# Patient Record
Sex: MALE | Race: WHITE | NOT HISPANIC OR LATINO | Employment: UNEMPLOYED | URBAN - METROPOLITAN AREA
[De-identification: names, ages, dates, MRNs, and addresses within clinical notes are randomized per-mention and may not be internally consistent; named-entity substitution may affect disease eponyms.]

---

## 2019-03-12 ENCOUNTER — OFFICE VISIT (OUTPATIENT)
Dept: FAMILY MEDICINE CLINIC | Facility: CLINIC | Age: 24
End: 2019-03-12

## 2019-03-12 VITALS
HEIGHT: 74 IN | WEIGHT: 238 LBS | BODY MASS INDEX: 30.54 KG/M2 | DIASTOLIC BLOOD PRESSURE: 86 MMHG | TEMPERATURE: 98 F | HEART RATE: 80 BPM | SYSTOLIC BLOOD PRESSURE: 112 MMHG

## 2019-03-12 DIAGNOSIS — S61.315D LACERATION OF LEFT RING FINGER WITHOUT FOREIGN BODY WITH DAMAGE TO NAIL, SUBSEQUENT ENCOUNTER: Primary | ICD-10-CM

## 2019-03-12 DIAGNOSIS — S62.639D OPEN AVULSION FRACTURE OF DISTAL PHALANX OF FINGER WITH ROUTINE HEALING, SUBSEQUENT ENCOUNTER: ICD-10-CM

## 2019-03-12 PROCEDURE — 99203 OFFICE O/P NEW LOW 30 MIN: CPT | Performed by: FAMILY MEDICINE

## 2019-03-12 RX ORDER — HYDROCODONE BITARTRATE AND ACETAMINOPHEN 5; 325 MG/1; MG/1
1 TABLET ORAL EVERY 6 HOURS PRN
COMMUNITY
End: 2019-03-15

## 2019-03-12 NOTE — PROGRESS NOTES
"Subjective   Anand Chahal is a 23 y.o. male seen today for Laceration (finger on left hand x 6 days ago).     Laceration    The incident occurred 5 to 7 days ago. The laceration is located on the left hand (left first finger.). The laceration mechanism was a blunt object (flap laceration of the left first finger). The pain is at a severity of 4/10. The pain has been fluctuating since onset. His tetanus status is UTD.      The injury occurred on March 6 while the patient was preparing to return from [place].  The trailer hitch on his vehicle disengaged and caught the tip of his left index finger.  He sustained a nearly circular laceration to the tip of his left index finger that included the distal portion of the fingernail.  There also was a guillotine fracture and loss of the distal aspect of the distal phalanx of the left second finger.  X-rays in the emergency department that he went to in [place] revealed no other fracture.  After thorough irrigation the soft tissue at the tip of the finger was approximated and sutured to the finger.  A total of 5 sutures were utilized including 1 absorbable Dexon applied through the fingernail in order to approximate the nail to the finger.    The following portions of the patient's history were reviewed and updated as appropriate: allergies, current medications, past social history and problem list.    Review of Systems   Constitutional: Negative for chills and fever.   Hematological: Does not bruise/bleed easily.       Objective   /86   Pulse 80   Temp 98 °F (36.7 °C)   Ht 188 cm (74\")   Wt 108 kg (238 lb)   BMI 30.56 kg/m²   Physical Exam   Skin:   Physical examination of the left ring finger reveals pink coloration of the skin at the distal fingertip.  There are a total of 5 sutures.  For these are Vicryl used to approximate the laceration of the skin.  There is 1 Dexon suture that is used to approximate the fingernail to the distal finger.  There is " some bruising at the pad of the finger but I think there is still viable tissue there.  There is no black skin.  There is no evidence of any erythema or swelling or pus.  1 of the Vicryl sutures is loose and was removed.  In addition the Dexon suture approximating the nail was removed as well.       Assessment/Plan   Problem List Items Addressed This Visit     None      Visit Diagnoses     Laceration of left ring finger without foreign body with damage to nail, subsequent encounter    -  Primary    Open avulsion fracture of distal phalanx of finger with routine healing, subsequent encounter            The injury appears to be healing in good fashion.  The patient has been on cephalexin 4 times a day.  He no longer requires opioids for analgesia.  He is using ibuprofen.  I instructed him to keep the wound clean and dry.  A dressing was applied here today including a nonstick dressing using Vaseline gauze.  He can use ibuprofen as needed for pain.  Elevate the finger and to return to see us in 3 days at which time we will remove the remaining 3 sutures.  We also will give consideration to follow-up x-ray of the avulsed distal phalanx.      Use Ibuprofen as needed for pain.    Keep the wound clean and dry.    Finish out the Keflex at three times a day.    Follow up in 3 days.

## 2019-03-15 ENCOUNTER — OFFICE VISIT (OUTPATIENT)
Dept: FAMILY MEDICINE CLINIC | Facility: CLINIC | Age: 24
End: 2019-03-15

## 2019-03-15 VITALS
WEIGHT: 234 LBS | HEIGHT: 74 IN | RESPIRATION RATE: 16 BRPM | BODY MASS INDEX: 30.03 KG/M2 | HEART RATE: 72 BPM | DIASTOLIC BLOOD PRESSURE: 60 MMHG | TEMPERATURE: 98.3 F | SYSTOLIC BLOOD PRESSURE: 96 MMHG | OXYGEN SATURATION: 97 %

## 2019-03-15 DIAGNOSIS — S61.311D LACERATION OF LEFT INDEX FINGER WITHOUT FOREIGN BODY WITH DAMAGE TO NAIL, SUBSEQUENT ENCOUNTER: ICD-10-CM

## 2019-03-15 DIAGNOSIS — Z48.02 VISIT FOR SUTURE REMOVAL: Primary | ICD-10-CM

## 2019-03-15 PROCEDURE — 99213 OFFICE O/P EST LOW 20 MIN: CPT | Performed by: FAMILY MEDICINE

## 2019-03-15 NOTE — PROGRESS NOTES
"Subjective   Anand Chahal is a 23 y.o. male seen today for Finger Injury.     History of Present Illness   The patient is here today for suture removal of the left ring finger.    States he is doing much better.  Denies any chest pain or shortness of breath.      The following portions of the patient's history were reviewed and updated as appropriate: allergies, current medications, past social history and problem list.    Review of Systems    Objective   BP 96/60   Pulse 72   Temp 98.3 °F (36.8 °C)   Resp 16   Ht 188 cm (74\")   Wt 106 kg (234 lb)   SpO2 97%   BMI 30.04 kg/m²   Physical Exam   Skin:   Examination of the left index finger does reveal that is slightly foreshortened.  The tip of the finger has good vascularity and is pink.  The sutures are doing well and are ready to be removed.  There is no evidence of tenderness or drainage.  There is darkening of the skin at the pad of the finger.  This is about a 8 mm diameter area.  The examination otherwise is normal.  There is some decrease in flexion of the PIP joint but this should improve with time.       Assessment/Plan   Problem List Items Addressed This Visit     None      Visit Diagnoses     Visit for suture removal    -  Primary    Laceration of left index finger without foreign body with damage to nail, subsequent encounter          Suture Removal  Date/Time: 3/15/2019 11:09 AM  Performed by: Ernesto Menezes MD  Authorized by: Ernesto Menezes MD   Consent: Verbal consent obtained.  Consent given by: patient  Patient understanding: patient states understanding of the procedure being performed  Patient consent: the patient's understanding of the procedure matches consent given  Patient identity confirmed: verbally with patient  Body area: upper extremity  Location details: left index finger  Wound Appearance: pink  Sutures Removed: 3  Patient tolerance: Patient tolerated the procedure well with no immediate complications              "   Drink plenty fluids.    Continue to soak the finger as dong.    Follow up as needed.                   Scribed for Dr Ernesto Menezes by Mary Anne Rubio CMA.          I, Ernesto Menezes MD, personally performed the services described in this documentation, as scribed by Mary Anne Rubio in my presence, and is both accurate and complete.        (Please note that portions of this note were completed with a voice recognition program. Efforts were made to edit the dictations,but occasionally words are mis transcribed.)